# Patient Record
Sex: MALE | Race: WHITE | HISPANIC OR LATINO | URBAN - METROPOLITAN AREA
[De-identification: names, ages, dates, MRNs, and addresses within clinical notes are randomized per-mention and may not be internally consistent; named-entity substitution may affect disease eponyms.]

---

## 2022-12-08 ENCOUNTER — EMERGENCY (EMERGENCY)
Facility: HOSPITAL | Age: 62
LOS: 1 days | Discharge: HOME | End: 2022-12-08
Attending: EMERGENCY MEDICINE
Payer: MEDICAID

## 2022-12-08 VITALS
SYSTOLIC BLOOD PRESSURE: 116 MMHG | HEART RATE: 80 BPM | RESPIRATION RATE: 18 BRPM | DIASTOLIC BLOOD PRESSURE: 70 MMHG | TEMPERATURE: 98 F | OXYGEN SATURATION: 96 %

## 2022-12-08 VITALS — WEIGHT: 177.69 LBS | HEIGHT: 68.5 IN

## 2022-12-08 LAB
ALBUMIN SERPL ELPH-MCNC: 4.1 G/DL — SIGNIFICANT CHANGE UP (ref 3.3–5.2)
ALP SERPL-CCNC: 64 U/L — SIGNIFICANT CHANGE UP (ref 40–120)
ALT FLD-CCNC: 46 U/L — HIGH
ANION GAP SERPL CALC-SCNC: 12 MMOL/L — SIGNIFICANT CHANGE UP (ref 5–17)
APTT BLD: 27.3 SEC — LOW (ref 27.5–35.5)
AST SERPL-CCNC: 32 U/L — SIGNIFICANT CHANGE UP
BASOPHILS # BLD AUTO: 0.02 K/UL — SIGNIFICANT CHANGE UP (ref 0–0.2)
BASOPHILS NFR BLD AUTO: 0.2 % — SIGNIFICANT CHANGE UP (ref 0–2)
BILIRUB SERPL-MCNC: 0.5 MG/DL — SIGNIFICANT CHANGE UP (ref 0.4–2)
BUN SERPL-MCNC: 12.4 MG/DL — SIGNIFICANT CHANGE UP (ref 8–20)
CALCIUM SERPL-MCNC: 8.7 MG/DL — SIGNIFICANT CHANGE UP (ref 8.4–10.5)
CHLORIDE SERPL-SCNC: 101 MMOL/L — SIGNIFICANT CHANGE UP (ref 96–108)
CO2 SERPL-SCNC: 24 MMOL/L — SIGNIFICANT CHANGE UP (ref 22–29)
CREAT SERPL-MCNC: 0.9 MG/DL — SIGNIFICANT CHANGE UP (ref 0.5–1.3)
EGFR: 96 ML/MIN/1.73M2 — SIGNIFICANT CHANGE UP
EOSINOPHIL # BLD AUTO: 0 K/UL — SIGNIFICANT CHANGE UP (ref 0–0.5)
EOSINOPHIL NFR BLD AUTO: 0 % — SIGNIFICANT CHANGE UP (ref 0–6)
FLUAV AG NPH QL: SIGNIFICANT CHANGE UP
FLUBV AG NPH QL: SIGNIFICANT CHANGE UP
GLUCOSE SERPL-MCNC: 130 MG/DL — HIGH (ref 70–99)
HCT VFR BLD CALC: 43.7 % — SIGNIFICANT CHANGE UP (ref 39–50)
HGB BLD-MCNC: 14.5 G/DL — SIGNIFICANT CHANGE UP (ref 13–17)
IMM GRANULOCYTES NFR BLD AUTO: 0.3 % — SIGNIFICANT CHANGE UP (ref 0–0.9)
INR BLD: 1.14 RATIO — SIGNIFICANT CHANGE UP (ref 0.88–1.16)
LYMPHOCYTES # BLD AUTO: 1.01 K/UL — SIGNIFICANT CHANGE UP (ref 1–3.3)
LYMPHOCYTES # BLD AUTO: 11.5 % — LOW (ref 13–44)
MCHC RBC-ENTMCNC: 29.2 PG — SIGNIFICANT CHANGE UP (ref 27–34)
MCHC RBC-ENTMCNC: 33.2 GM/DL — SIGNIFICANT CHANGE UP (ref 32–36)
MCV RBC AUTO: 88.1 FL — SIGNIFICANT CHANGE UP (ref 80–100)
MONOCYTES # BLD AUTO: 0.75 K/UL — SIGNIFICANT CHANGE UP (ref 0–0.9)
MONOCYTES NFR BLD AUTO: 8.5 % — SIGNIFICANT CHANGE UP (ref 2–14)
NEUTROPHILS # BLD AUTO: 7 K/UL — SIGNIFICANT CHANGE UP (ref 1.8–7.4)
NEUTROPHILS NFR BLD AUTO: 79.5 % — HIGH (ref 43–77)
PLATELET # BLD AUTO: 161 K/UL — SIGNIFICANT CHANGE UP (ref 150–400)
POTASSIUM SERPL-MCNC: 4.2 MMOL/L — SIGNIFICANT CHANGE UP (ref 3.5–5.3)
POTASSIUM SERPL-SCNC: 4.2 MMOL/L — SIGNIFICANT CHANGE UP (ref 3.5–5.3)
PROT SERPL-MCNC: 7.4 G/DL — SIGNIFICANT CHANGE UP (ref 6.6–8.7)
PROTHROM AB SERPL-ACNC: 13.3 SEC — SIGNIFICANT CHANGE UP (ref 10.5–13.4)
RBC # BLD: 4.96 M/UL — SIGNIFICANT CHANGE UP (ref 4.2–5.8)
RBC # FLD: 13.5 % — SIGNIFICANT CHANGE UP (ref 10.3–14.5)
RSV RNA NPH QL NAA+NON-PROBE: SIGNIFICANT CHANGE UP
SARS-COV-2 RNA SPEC QL NAA+PROBE: DETECTED
SODIUM SERPL-SCNC: 137 MMOL/L — SIGNIFICANT CHANGE UP (ref 135–145)
WBC # BLD: 8.81 K/UL — SIGNIFICANT CHANGE UP (ref 3.8–10.5)
WBC # FLD AUTO: 8.81 K/UL — SIGNIFICANT CHANGE UP (ref 3.8–10.5)

## 2022-12-08 PROCEDURE — 93971 EXTREMITY STUDY: CPT | Mod: 26,LT

## 2022-12-08 PROCEDURE — 85730 THROMBOPLASTIN TIME PARTIAL: CPT

## 2022-12-08 PROCEDURE — 99285 EMERGENCY DEPT VISIT HI MDM: CPT

## 2022-12-08 PROCEDURE — 87637 SARSCOV2&INF A&B&RSV AMP PRB: CPT

## 2022-12-08 PROCEDURE — 99284 EMERGENCY DEPT VISIT MOD MDM: CPT

## 2022-12-08 PROCEDURE — 85025 COMPLETE CBC W/AUTO DIFF WBC: CPT

## 2022-12-08 PROCEDURE — 36415 COLL VENOUS BLD VENIPUNCTURE: CPT

## 2022-12-08 PROCEDURE — 85610 PROTHROMBIN TIME: CPT

## 2022-12-08 PROCEDURE — 80053 COMPREHEN METABOLIC PANEL: CPT

## 2022-12-08 PROCEDURE — 93971 EXTREMITY STUDY: CPT

## 2022-12-08 RX ORDER — ONDANSETRON 8 MG/1
4 TABLET, FILM COATED ORAL ONCE
Refills: 0 | Status: COMPLETED | OUTPATIENT
Start: 2022-12-08 | End: 2022-12-08

## 2022-12-08 RX ORDER — IBUPROFEN 200 MG
600 TABLET ORAL ONCE
Refills: 0 | Status: COMPLETED | OUTPATIENT
Start: 2022-12-08 | End: 2022-12-08

## 2022-12-08 RX ADMIN — Medication 600 MILLIGRAM(S): at 03:31

## 2022-12-08 RX ADMIN — ONDANSETRON 4 MILLIGRAM(S): 8 TABLET, FILM COATED ORAL at 03:32

## 2022-12-08 NOTE — CONSULT NOTE ADULT - ASSESSMENT
62 year old gentleman, presenting with myaglias, dizziness, cough, denies sick contacts, denies comorbidities but was noted to be diagnosed with a L LE DVT as a outpatient, he was prescribed lovenox 40mg SC daily and has been taking the lovenox but presents today with acute viral symptoms, he tested positive for COVID in the ED.  Vascular surgery was contacted as a L LE ultrasound was done in the ED for leg swelling which showed a L DVT extending from the peroneal to the femoral vein on the L, no evidence of phelgmasia    Recommendations:   Compression stockings   Outpatient follow up with [facility]   No acute intervention at this time  Leg elevation   Medicine eval for symptomatic COVID infection     Plan discussed with Dr. Villa, attending vascular surgeon

## 2022-12-08 NOTE — ED PROVIDER NOTE - NS ED ATTENDING STATEMENT MOD
This was a shared visit with the HARMAN. I reviewed and verified the documentation and independently performed the documented:

## 2022-12-08 NOTE — ED PROVIDER NOTE - CARE PLAN
1 Principal Discharge DX:	DVT, lower extremity  Secondary Diagnosis:	2019 novel coronavirus disease (COVID-19)

## 2022-12-08 NOTE — ED PROVIDER NOTE - PHYSICAL EXAMINATION
Gen: No acute distress, non toxic  HEENT: Mucous membranes moist, pink conjunctivae, EOMI  CV: RRR, nl s1/s2.  Resp: CTAB, normal rate and effort  GI: Abdomen soft, NT, ND. No rebound, no guarding  : No CVAT  Neuro: A&O x 3, moving all 4 extremities  MSK: +swelling left lower distal calf. No spine or joint tenderness to palpation  Skin: No rashes. intact and perfused.

## 2022-12-08 NOTE — ED PROVIDER NOTE - NSFOLLOWUPINSTRUCTIONS_ED_ALL_ED_FT
- Continue home anticoagulation.   - Acetaminophen 650mg every 4 hours as needed for fever.   - Compression socks.   - Please call today to schedule follow up appointment with vascular surgery.   - Please bring all documentation from your ED visit to any related future follow up appointment.  - Please call to schedule follow up appointment with your primary care physician within 24-48 hours.  - Please seek immediate medical attention or return to the ED for any new/worsening, signs/symptoms, or concerns.    Feel better!     - Continuar la anticoagulación domiciliaria.  - Acetaminofén 650 mg cada 4 horas según sea necesario para la fiebre.  - Calcetines de compresión.  - Llame hoy para programar zee marcy de seguimiento con cirugía vascular.  - Traiga toda la documentación de alvarez visita al ED a cualquier marcy de seguimiento futura relacionada.  - Llame para programar zee marcy de seguimiento con alvarez médico de atención primaria dentro de las 24 a 48 horas.  - Busque atención médica inmediata o regrese al servicio de urgencias por cualquier signo/síntoma o inquietud nuevos/empeorados.    ¡Sentirse mejor!        Trombosis venosa profunda    Deep Vein Thrombosis    A person's legs with close-ups showing a normal vein, a vein with a blood clot, and a blood clot that breaks loose.   La trombosis venosa profunda (TVP) es zee afección en la que se forma un coágulo de fer en zee vena del sistema venoso profundo. [place] puede ocurrir en la parte inferior de la pierna, el muslo, la pelvis, el brazo o el tree. Un coágulo es fer que se ha espesado y convertido en gel o se ha solidificado. Esta afección es grave y puede poner en peligro la anuja si el coágulo llega a las arterias de los pulmones y causa zee obstrucción (embolia pulmonar). La TVP también puede dañar las venas de la pierna, y eso puede causar zee enfermedad venosa a ronel plazo, dolor en la pierna, hinchazón, cambio de color y úlceras o llagas (síndrome postrombótico).      ¿Cuáles son las causas?    Esta afección puede ser causada por lo siguiente:  •Zee ralentización de la circulación sanguínea.      •Daño en zee vena.      •Zee afección que hace que la fer se coagule más fácilmente, brandy ciertos trastornos hemorrágicos.        ¿Qué incrementa el riesgo?    Los siguientes factores pueden hacer que sea más propenso a desarrollar esta afección:  •Obesidad.      •Tener edad avanzada, en especial más de 60 años.    •Ser inactivo o no moverse (estilo de anuja sedentario). Estas medidas pueden incluir:  •Permanecer sentado o recostado pasquale más de 4 a 6 horas por otro motivo que no sea dormir por la noche.      •Estar en el hospital o someterse a zee cirugía mayor o prolongada.      •Tener lesiones recientes en los huesos que reducen el movimiento, brandy roturas (fracturas), especialmente en las extremidades inferiores.      •Haberse sometido a zee cirugía ortopédica reciente en las extremidades inferiores.        •Estar embarazada, omid a tori o zeenat dado a tori recientemente.      •Thom medicamentos que contienen estrógenos, brandy las píldoras anticonceptivas o la terapia de reemplazo hormonal.      •Consumir productos que contengan nicotina o tabaco, especialmente si usa un método anticonceptivo hormonal.      •Tener antecedentes de zee enfermedad de los vasos sanguíneos (enfermedad vascular periférica) o enfermedad cardíaca congestiva.      •Tener antecedentes de cáncer, especialmente si recibió tratamiento con quimioterapia.        ¿Cuáles son los signos o síntomas?    Los síntomas de esta afección incluyen:  •Hinchazón, dolor, presión o sensibilidad en un brazo o zee pierna.      •Un brazo o zee pierna se calienta, enrojece o cambia de color.      •Zee pierna se torna muy pálida o azulada. Puede tener zee TVP darnell. [place] es poco frecuente.      Si el coágulo está ubicado en la pierna, puede notar que los síntomas empeoran al pararse o caminar.    En algunos casos, no hay síntomas.      ¿Cómo se diagnostica?    Esta afección se diagnostica mediante:  •Los antecedentes médicos y un examen físico.    •Pruebas, brandy, por ejemplo:  •Análisis de fer para verificar qué tan lobito coagula alvarez fer.      •Zee ecografía Doppler. Esta es la mejor forma de detectar zee TVP.      •Venograma por tomografía computarizada (TC). Se inyecta un tinte de contraste en zee vena y se sofia radiografías para verificar si hay coágulos. [place] es útil para las venas del pecho o de la pelvis.          ¿Cómo se trata?    El tratamiento de esta afección depende de lo siguiente:  •La causa de alvarez TVP.      •El tamaño y la ubicación de la TVP, o tener más de zee TVP.      •Alvarez riesgo de tener zee hemorragia y tener más coágulos.      •Otras enfermedades que puede tener.      El tratamiento puede incluir:  •Thom un medicamento que diluye la fer (anticoagulante) para evitar que se formen más coágulos o que los coágulos actuales aumenten de tamaño.      •Usar medias de compresión.      •Aplicarse inyecciones de medicamentos para romper el coágulo (trombólisis dirigida por catéter).    •Procedimientos quirúrgicos cuando la TVP es grave o difícil de tratar. Estos se pueden realizar para:  •Aislar y retirar el coágulo.      •Colocar un filtro en la vena cava inferior (VCI). Eva filtro se coloca en zee vena darnell que se llama vena cava inferior para capturar los coágulos de fer antes de que lleguen a los pulmones.        Puede recibir tratamientos médicos pasquale 6 meses o más tiempo.      Siga estas instrucciones en alvarez casa:    Si está tomando anticoagulantes, tenga en cuenta lo siguiente:     •Hable con el médico antes de thom cualquier medicamento que contenga aspirina o antiinflamatorios no esteroideos (RADHA), brandy el ibuprofeno. Estos medicamentos aumentan el riesgo de tener zee hemorragia peligrosa.      •[place] los medicamentos exactamente brandy se lo indicaron, todos los días a la misma hora. No se saltee zee dosis. No tome más de la dosis recetada. [place] es importante.      •Pregúntele a alvarez médico sobre los alimentos y medicamentos que pueden cambiar la forma en que funciona el anticoagulante o interactuar con eva. Evite estos alimentos y medicamentos si se lo indican.    •Evite todo lo que pueda causar sangrados o moretones. Puede sangrar con más facilidad mientras vasile anticoagulantes.  •Tenga sumo cuidado al usar cuchillos, tijeras u otros objetos filosos.      •Aféitese con zee rasuradora eléctrica en lugar de hacerlo con zee hoja de afeitar.      •Evite las actividades que podrían causarle lesiones o moretones y siga las instrucciones para evitar las caídas.      •Informe al médico si ha tenido sangrado interno, úlceras sangrantes o enfermedades neurológicas, brandy accidentes cerebrovasculares o aneurismas cerebrales.        •Use un brazalete de alerta médica o lleve zee tarjeta con zee lista de los medicamentos que vasile.      Instrucciones generales     •Use los medicamentos de venta larry y los recetados solamente brandy se lo haya indicado el médico.      •Retome katharina actividades normales según lo indicado por el médico. Pregúntele al médico qué actividades son seguras para usted.      •Si se lo recomienda el médico, use medias de compresión según katharina indicaciones. Estas medias ayudan a evitar la formación de coágulos de fer y a reducir la hinchazón de las piernas. Nunca use medias de compresión para dormir por la noche.      •Concurra a todas las visitas de seguimiento. [place] es importante.        Dónde buscar más información    •American Heart Association (Asociación Estadounidense del [place]): www.heart.org      •Centers for Disease Control and Prevention (Centros para el Control y la Prevención de Enfermedades): www.cdc.gov      •National Heart, Lung, and Blood [place] (Instituto Nacional del [place], los Pulmones y la Fer): www.nhlbi.nih.gov        Comuníquese con un médico si:    •Se saltea zee dosis del anticoagulante.      •Tiene moretones inusuales u otros cambios de color.      •Tiene dolor, hinchazón o enrojecimiento nuevos en un brazo o zee pierna o se produce un empeoramiento de alguno de estos síntomas.      •Tiene entumecimiento u hormigueo que empeora en un brazo o zee pierna.      •Tiene un cambio significativo de color (palidez o color azulado) en la extremidad que tiene la TVP.        Solicite ayuda de inmediato si:  •Tiene signos o síntomas de que un coágulo de fer se ha movido a los pulmones. Pueden incluir:  •Falta de aire.      •Dolor de pecho.      •Latidos cardíacos acelerados o irregulares (palpitaciones).      •Sensación de desvanecimiento, mareos o desmayos.      •Tos con fer.      •Tiene signos o síntomas de que la fer está demasiado anticoagulada. Pueden incluir:  •Fer en el vómito, las heces o la orina.      •Un nacho que no luis de sangrar.      •Período menstrual más abundante que lo normal.      •Dolor de shawn intenso o confusión.        Estos síntomas pueden indicar zee emergencia. Solicite ayuda de inmediato. Llame al 911.   • No espere a jonathan si los síntomas desaparecen.        •  No conduzca por katharina propios medios hasta el hospital.         Resumen    •La trombosis venosa profunda (TVP) sucede cuando se forma un coágulo de fer en zee vena profunda. [place] puede ocurrir en la parte inferior de la pierna, el muslo, la pelvis, el brazo o el tree.      •Los síntomas afectan el brazo o la pierna, y pueden incluir hinchazón, dolor, sensibilidad, calor, enrojecimiento o cambio de color.      •Esta afección se puede tratar con medicamentos. En casos graves, se puede realizar un procedimiento o zee cirugía para extirpar o disolver los coágulos.      •Si está recibiendo anticoagulantes, tómelos exactamente brandy se lo hayan indicado. No se saltee zee dosis. No tome más de lo recetado.      •Obtenga ayuda de inmediato si tiene dolor de shawn intenso, le falta el aire, siente dolor en el pecho, tiene latidos cardíacos acelerados o irregulares, o ve fer en el vómito, la orina o las heces.      Esta información no tiene brandy fin reemplazar el consejo del médico. Asegúrese de hacerle al médico cualquier pregunta que tenga.      COVID-19    COVID-19      La enfermedad por coronavirus 2019, o COVID-19, es zee infección sistémica causada por un nuevo coronavirus llamado SARS-CoV-2. En algunas personas, es posible que el virus no ocasione síntomas. En otras, puede ocasionar síntomas leves o graves. Algunas personas con infección grave desarrollan enfermedades graves, que puede provocar síndrome de dificultad respiratoria aguda y choque.      ¿Cuáles son las causas?  The human body, showing how the coronavirus travels from the air to a person's lungs.   Esta enfermedad es causada por un virus. El virus puede estar en el aire o en superficies en forma de gotitas o puja de varios tamaños. Usted puede contagiarse con eva virus:  •Al inspirar las gotitas de zee persona infectada. Las gotitas pueden diseminarse cuando zee persona respira, habla, canta, tose o estornuda.      •Al tocar algo, brandy zee lynne o el picaporte de zee missy, que estuvo expuesto al virus (está contaminado) y luego tocarse la boca, nariz o los ojos.        ¿Qué incrementa el riesgo?    Riesgo de infección:     Es más probable que se infecte con el virus del COVID-19 si:  •Está a menos de 1.8 m (6 pies) de zee persona con COVID-19 pasquale 15 minutos o más.      •Está cuidando a zee persona que está infectada con COVID-19.      •Está en contacto personal estrecho con otras personas. El contacto personal estrecho incluye abrazar, besar o compartir utensilios para comer o beber.      Riesgo de enfermedad grave debido al COVID-19:    Es más probable que se enferme gravemente por el virus de COVID-19 si:  •Tiene cáncer.    •Tiene zee enfermedad a ronel plazo (crónica), brandy las siguientes:  •Enfermedad pulmonar crónica, incluida embolia pulmonar, enfermedad pulmonar obstructiva crónica y fibrosis quística.      •Enfermedad crónica que disminuye la capacidad del cuerpo para combatir las infecciones (immunodeprimido).      •Afecciones cardíacas graves, brandy insuficiencia cardíaca, arteriopatía coronaria o miocardiopatía.      •Diabetes.      •Enfermedad renal crónica.      •Enfermedades hepáticas, brandy cirrosis, esteatosis hepática no alcohólica, hepatopatía alcohólica o hepatitis autoinmunitaria.        •Es antionette.      •Está embarazada o se embarazó recientemente.      •Tiene anemia drepanocítica.        ¿Cuáles son los signos o síntomas?    Los síntomas de esta afección pueden ser de leves a graves. Los síntomas pueden aparecer en el término de 2 a 14 días después de zeenat estado expuesto al virus. Incluyen lo siguiente:  •Fiebre o escalofríos.      •Dificultad para respirar o falta de aire.      •Sentirse cansado o muy cansado.      •Farheen de shawn, farheen en el cuerpo o farheen musculares.      •Rinitis o congestión nasal, estornudos, tos, dolor de garganta.      •Nueva pérdida del sentido del gusto o del olfato. [place] es poco frecuente.      Algunas personas también pueden tener problemas estomacales, brandy náuseas, vómitos o diarrea.    Es posible que otras personas no tengan síntomas de COVID-19.      ¿Cómo se diagnostica?  A sample being collected by swabbing the nose.   Esta afección se puede diagnosticar mediante el análisis de muestras para detectar el virus del COVID-19. Las pruebas más frecuentes son la prueba PCR y la prueba de antígenos. Las pruebas pueden realizarse en el laboratorio o en el hogar. Incluyen lo siguiente:  •Usar un hisopo para thom zee muestra de líquido de la parte posterior de la nariz y la garganta (líquido nasofaríngeo), de la nariz o de la garganta.      •Analizar zee muestra de saliva de la boca.      •Analizar zee muestra de mucosidad expectorada de los pulmones (esputo).        ¿Cómo se trata?    El tratamiento del COVID-19 depende de la gravedad de la afección.  •Los síntomas leves pueden controlarse en el hogar con reposo, líquidos y medicamentos de venta larry.    •Los síntomas graves pueden tratarse en zee unidad de cuidados intensivos o UCI en el hospital. El tratamiento en la UCI puede incluir lo siguiente:  •Oxígeno complementario. Para administrar oxígeno extra, se utiliza un tubo en la nariz, zee mascarilla o zee nathanael de oxígeno.    •Medicamentos. Se le pueden administrar medicamentos:  •Para ayudar al organismo a combatir determinados virus que pueden causar enfermedades (antivíricos).      •Para disminuir la inflamación y suprimir el sistema inmunitario (corticoesteroides).      •Para prevenir o tratar coágulos de fer, si se desarrollan (antitrombóticos).        •Anticuerpos fabricados en un laboratorio que pueden restaurar o fortalecer el sistema inmunitario natural del organismo (anticuerpo monoclonal).      •Colocarlo boca abajo (decúbito prono). [place] facilita el ingreso de oxígeno a los pulmones.      •Medidas para controlar zee infección.        Si está en riesgo de padecer zee enfermedad más grave por COVID-19, alvarez médico puede recetarle zee combinación de dos anticuerpos monoclonales de acción prolongada, administrados juntos cada 6 meses.      ¿Cómo se previene?    Para protegerse:   •Vacúnese. Las vacunas contra el COVID-19 están disponibles para todas las personas mayores de 6 meses.  •Se recomienda la vacunación para las mujeres embarazadas, que pueden quedarse embarazadas o que están en período de lactancia.      •Los pacientes que requieran zee cirugía mayor deben planificar alvarez vacunación varios días antes o después de la cirugía.      •Hable con alvarez médico sobre la posibilidad de recibir anticuerpos monoclonales experimentales. Eva tratamiento ha sido aprobado bajo autorización de uso de emergencia para prevenir enfermedades graves antes o después de que esté expuesto al COVID-19. Pueden administrarle anticuerpos monoclonales si:  •Está moderada o severamente inmunodeprimido. [place] incluye los tratamientos que reducen alvarez respuesta inmunitaria. Las personas inmunodeprimidas pueden no desarrollar protección contra el COVID-19 cuando se vacunan.      •No puede vacunarse. Es posible que no reciba zee vacuna si tiene zee reacción alérgica grave a la vacuna o a katharina componentes.      •Si no tiene la vacunación completa.      •Está en contacto estrecho con zee persona infectada con SARS-CoV-2 o con alto riesgo de exposición al SARS-CoV-2 en zee institución en la que hay casos de COVID-19.      •Está en riesgo de contraer enfermedades por las nuevas variantes del virus del COVID-19.        Cómo proteger a los demás:     Si tiene síntomas de COVID-19, tome medidas para evitar que el virus se propague a otras personas.  •Quédese en alvarez casa. Salga de alvarez casa solo para buscar atención médica. No use el transporte público, de ser posible.      •No viaje mientras esté enfermo.      •Lávese las kane frecuentemente con agua y jabón pasquale al menos 20 segundos. Use desinfectante para kane con alcohol si no dispone de agua y jabón.      •Manténgase alejado de quienes vivan con usted. Permita que los miembros de la stevie sanos cuiden a los niños y las mascotas, si es posible. Si tiene que cuidar a los niños o las mascotas, lávese las kane con frecuencia y use un barbijo. Si es posible, permanezca en alvarez habitación, separado de los demás. Utilice un baño diferente.      •Asegúrese de que todas las personas que viven en alvarez casa se laven lobito las kane y con frecuencia.      •Tosa o estornude en un pañuelo de papel o sobre alvarez manga o codo. No tosa o estornude al aire ni se cubra la boca o la nariz con la mano.        Dónde obtener más información    •Centers for Disease Control and Prevention (CDC) (Centros para el Control y la Prevención de Enfermedades): www.cdc.gov/coronavirus      •World Health Organization (Organización Mundial de la [place]): www.who.int/health-topics/coronavirus        Solicite ayuda de inmediato si:    •Tiene dificultad para respirar.      •Siente dolor u opresión en el pecho.      •Experimenta confusión.      •Tiene las uñas de los dedos y los labios de color azulado.      •Tiene dificultad para despertarse.      •Los síntomas empeoran.      Estos síntomas pueden indicar zee emergencia. Solicite ayuda de inmediato. Llame al 911.   • No espere a jonathan si los síntomas desaparecen.        •  No conduzca por katharina propios medios hasta el hospital.         Resumen    •El COVID-19 es zee infección respiratoria causada por un virus.      •Algunas personas con infección grave por COVID-19 desarrollan enfermedades graves, que puede provocar síndrome de dificultad respiratoria aguda y choque.      •El virus que causa el COVID-19 es contagioso. [place] significa que puede transmitirse de zee persona a otra a través de las gotitas que se despiden al respirar, hablar, cantar, toser y estornudar.      •Los síntomas leves de COVID-19 pueden controlarse en el hogar con reposo, líquidos y medicamentos de venta larry.      Esta información no tiene brandy fin reemplazar el consejo del médico. Asegúrese de hacerle al médico cualquier pregunta que tenga.

## 2022-12-08 NOTE — ED PROVIDER NOTE - PROGRESS NOTE DETAILS
intact Pt to be seen by vascular for DVT, out patient medication failure. CARLOS Britton: Received during sign out. Cleared by vascular. No respiratory distress.

## 2022-12-08 NOTE — ED PROVIDER NOTE - PATIENT PORTAL LINK FT
You can access the FollowMyHealth Patient Portal offered by [facility] by registering at the following website: [email or web address]. By joining [employer]’s FollowMyHealth portal, you will also be able to view your health information using other applications (apps) compatible with our system.

## 2022-12-08 NOTE — ED ADULT NURSE REASSESSMENT NOTE - NS ED NURSE REASSESS COMMENT FT1
Pt received from [facility] [place] and assumed care @0730 hrs.   Patient found in a sitting position with spouse at bedside Patient is A&Ox4.  Bilateral  Lung sounds clear, No SOB, No LOC. PERRLA.  Physical assessment reveals: IV is in place  20 gauge in_RAC__Vital signs within normal limits.  Plan of care explained and reviewed with patient, call bell within reach.

## 2022-12-08 NOTE — CONSULT NOTE ADULT - SUBJECTIVE AND OBJECTIVE BOX
SURGERY CONSULT    HPI: 62 year old gentleman, presenting with myaglias, dizziness, cough, denies sick contacts, denies comorbidities but was noted to be diagnosed with a L LE DVT as a outpatient, he was prescribed lovenox 40mg SC daily and has been taking the lovenox but presents today with acute viral symptoms, he tested positive for COVID in the ED.  He denies claudication, hes not been using compression stockings and endorses some swelling to the LLE with standing.  Denies fever or chills.        PAST MEDICAL HISTORY:  DVT, lower extremity        PAST SURGICAL HISTORY:      ALLERGIES:  No Known Allergies      FAMILY HISTORY: Noncontributory    SOCIAL HISTORY: Denies tobacco, EtOH, illicit substance use.     HOME MEDICATIONS:    MEDICATIONS  (STANDING):    MEDICATIONS  (PRN):      VITALS & I/Os:  Vital Signs Last 24 Hrs  T(C): 36.6 (08 Dec 2022 05:18), Max: 36.7 (08 Dec 2022 00:15)  T(F): 97.8 (08 Dec 2022 05:18), Max: 98 (08 Dec 2022 00:15)  HR: 67 (08 Dec 2022 05:18) (67 - 80)  BP: 99/64 (08 Dec 2022 05:18) (99/64 - 116/70)  BP(mean): --  RR: 18 (08 Dec 2022 05:18) (18 - 18)  SpO2: 96% (08 Dec 2022 05:18) (96% - 96%)    Parameters below as of 08 Dec 2022 00:15  Patient On (Oxygen Delivery Method): room air      CAPILLARY BLOOD GLUCOSE          I&O's Summary      GENERAL: Alert, well developed, in no acute distress.  MENTAL STATUS: AAOx3. Appropriate affect.  HEENT: PERRLA. EOMI. MMM.  Trachea midline. No lymph node swelling or tenderness.  RESPIRATORY: CTAB. No wheezing, rales or rhonchi.  CARDIOVASCULAR: RRR. No audible murmurs, rubs or gallops.   GASTROINTESTINAL: Abdomen soft, NT, ND, -R/-G.  No pulsatile mass, no flank tenderness or suprapubic tenderness. No hepatosplenomegaly.  NEUROLOGIC: Cranial nerves II-XII grossly intact. No focal neurological deficits. Moves all extremities spontaneously. Sensation intact bilaterally.  INTEGUMENTARY: L LE slightly swollen compared to R LE, no evidence of phlegmasia, palpable L LE pulses, slight redness to the anterior tibial region, no obvious varicositites     LABS:                        14.5   8.81  )-----------( 161      ( 08 Dec 2022 04:44 )             43.7     12-08    137  |  101  |  12.4  ----------------------------<  130<H>  4.2   |  24.0  |  0.90    Ca    8.7      08 Dec 2022 04:44    TPro  7.4  /  Alb  4.1  /  TBili  0.5  /  DBili  x   /  AST  32  /  ALT  46<H>  /  AlkPhos  64  12-08    Lactate:    PT/INR - ( 08 Dec 2022 04:44 )   PT: 13.3 sec;   INR: 1.14 ratio         PTT - ( 08 Dec 2022 04:44 )  PTT:27.3 sec              IMAGING:    ACC: [id number] EXAM:  US DPLX LWR EXT VEINS LTD LT                          PROCEDURE DATE:  12/08/2022          INTERPRETATION:  CLINICAL INFORMATION: Left leg pain.    COMPARISON: None available.    TECHNIQUE: Duplex sonography of the LEFT LOWER extremity veins with color   and spectral Doppler, with and without compression.    FINDINGS:    Intraluminal echogenicity and lack of compression and the left common   femoral, femoral, popliteal, tibioperoneal trunk and peroneal veins   compatible with deep venous thrombosis.    There is normal compressibility of the left posterior tibial vein.  The contralateral common femoral vein is patent.    IMPRESSION:  Left lower extremity deep venous thrombosis: above and below the knee.    Findings were discussed with CARLOS EUCEDA [id number] 12/8/2022 4:17   AM by Dr. KINGSLEY with read back confirmation.    --- End of Report ---            LAVON KINGSLEY MD; Attending Radiologist  This document has been electronically signed. Dec  8 2022  4:21AM

## 2022-12-08 NOTE — ED ADULT TRIAGE NOTE - CHIEF COMPLAINT QUOTE
Pt presents to ED c/o flu-like symptoms for 3 days. Pt endorses fever and body aches, denies sick contacts. Pt states that he has PMHx DVT in lower extremities and is c/o some swelling to the left calf.

## 2022-12-08 NOTE — ED PROVIDER NOTE - ATTENDING APP SHARED VISIT CONTRIBUTION OF CARE
62y M w/ hx DVT on SQ heparin, presents for LLE swelling. Also with URI symptoms. Vitals stable. Pt with +LLE DVT. COVID+. Signed out to Dr. Helton pending vascular surgery recs.

## 2022-12-08 NOTE — ED PROVIDER NOTE - OBJECTIVE STATEMENT
62 year old male with flu like symptoms for 3 days. Pt reports subjective fevers, body aches, and cough. Pt reports vomiting 3 times last night before deciding to come to ED. Pt reports 3 days of swelling to left lower leg. Pt reports PMHx bilateral DVT and is on daily Subcutaneous heparin injection. Pt reports last taking IBU for fever at 1800. Denies SOB, chest pain, abdominal pain,

## 2022-12-12 PROBLEM — I82.409 ACUTE EMBOLISM AND THROMBOSIS OF UNSPECIFIED DEEP VEINS OF UNSPECIFIED LOWER EXTREMITY: Chronic | Status: ACTIVE | Noted: 2022-12-08

## 2022-12-21 PROBLEM — Z00.00 ENCOUNTER FOR PREVENTIVE HEALTH EXAMINATION: Status: ACTIVE | Noted: 2022-12-21

## 2022-12-27 ENCOUNTER — APPOINTMENT (OUTPATIENT)
Dept: VASCULAR SURGERY | Facility: CLINIC | Age: 62
End: 2022-12-27

## 2023-07-07 NOTE — ED PROVIDER NOTE - INTERNATIONAL TRAVEL
RECORDS RECEIVED FROM: Internal   REASON FOR VISIT: Sacroiliitis and Chronic bilateral low back pain with right-sided sciatica   Date of Appt: 7/11/23 1:30pm    NOTES (FOR ALL VISITS) STATUS DETAILS   OFFICE NOTE from referring provider Internal 6/22/23, 4/12/23, 12/26/22 Min Toledo PA-C @[facility]     MEDICATION LIST Internal    IMAGING  (FOR ALL VISITS)     X-RAY Internal [facility]  6/22/23 XR Lumbar Spine     CT (HEAD, NECK, SPINE) Internal [facility]  9/30/22 CT Abdomen Pelvis        
No

## 2025-06-27 ENCOUNTER — APPOINTMENT (OUTPATIENT)
Dept: GASTROENTEROLOGY | Facility: CLINIC | Age: 65
End: 2025-06-27
Payer: MEDICAID

## 2025-06-27 VITALS
HEIGHT: 66 IN | OXYGEN SATURATION: 98 % | SYSTOLIC BLOOD PRESSURE: 109 MMHG | HEART RATE: 59 BPM | BODY MASS INDEX: 27.97 KG/M2 | DIASTOLIC BLOOD PRESSURE: 66 MMHG | WEIGHT: 174 LBS | RESPIRATION RATE: 16 BRPM

## 2025-06-27 PROBLEM — Z12.11 ENCOUNTER FOR SCREENING COLONOSCOPY: Status: ACTIVE | Noted: 2025-06-27 | Resolved: 2025-07-11

## 2025-06-27 PROBLEM — Z86.718 HISTORY OF DVT (DEEP VEIN THROMBOSIS): Status: ACTIVE | Noted: 2025-06-27

## 2025-06-27 PROBLEM — Z78.9 NON-SMOKER: Status: ACTIVE | Noted: 2025-06-27

## 2025-06-27 PROBLEM — Z78.9 CAFFEINE USE: Status: ACTIVE | Noted: 2025-06-27

## 2025-06-27 PROBLEM — Z12.11 COLON CANCER SCREENING: Status: ACTIVE | Noted: 2025-06-27

## 2025-06-27 PROBLEM — Z85.46 HISTORY OF PROSTATE CANCER: Status: RESOLVED | Noted: 2025-06-27 | Resolved: 2025-06-27

## 2025-06-27 PROCEDURE — 99204 OFFICE O/P NEW MOD 45 MIN: CPT

## 2025-06-27 PROCEDURE — T1013: CPT

## 2025-06-27 PROCEDURE — G2211 COMPLEX E/M VISIT ADD ON: CPT | Mod: NC

## 2025-06-27 RX ORDER — POLYETHYLENE GLYCOL-3350 AND ELECTROLYTES WITH FLAVOR PACK 240; 5.84; 2.98; 6.72; 22.72 G/278.26G; G/278.26G; G/278.26G; G/278.26G; G/278.26G
240 POWDER, FOR SOLUTION ORAL
Qty: 1 | Refills: 0 | Status: ACTIVE | COMMUNITY
Start: 2025-06-27 | End: 1900-01-01

## 2025-06-27 RX ORDER — APIXABAN 2.5 MG/1
2.5 TABLET, FILM COATED ORAL
Refills: 0 | Status: ACTIVE | COMMUNITY